# Patient Record
Sex: MALE | Race: WHITE | URBAN - METROPOLITAN AREA
[De-identification: names, ages, dates, MRNs, and addresses within clinical notes are randomized per-mention and may not be internally consistent; named-entity substitution may affect disease eponyms.]

---

## 2024-02-22 ENCOUNTER — OFFICE VISIT (OUTPATIENT)
Age: 39
End: 2024-02-22

## 2024-02-22 VITALS
TEMPERATURE: 98.4 F | HEART RATE: 64 BPM | WEIGHT: 197.8 LBS | SYSTOLIC BLOOD PRESSURE: 136 MMHG | BODY MASS INDEX: 29.98 KG/M2 | OXYGEN SATURATION: 98 % | HEIGHT: 68 IN | DIASTOLIC BLOOD PRESSURE: 85 MMHG

## 2024-02-22 DIAGNOSIS — R68.89 FLU-LIKE SYMPTOMS: Primary | ICD-10-CM

## 2024-02-22 PROBLEM — J34.89 SINUS PRESSURE: Status: ACTIVE | Noted: 2024-02-22

## 2024-02-22 LAB
INFLUENZA A ANTIGEN, POC: NEGATIVE
INFLUENZA B ANTIGEN, POC: NEGATIVE

## 2024-02-22 ASSESSMENT — ENCOUNTER SYMPTOMS
COUGH: 1
SWOLLEN GLANDS: 0
NAUSEA: 0
SINUS PAIN: 1
DIARRHEA: 0
VOMITING: 0
SORE THROAT: 1

## 2024-02-22 NOTE — PROGRESS NOTES
Left Ear: Tympanic membrane and ear canal normal.      Nose: Congestion present.      Mouth/Throat:      Pharynx: Posterior oropharyngeal erythema present. No oropharyngeal exudate.   Eyes:      Conjunctiva/sclera: Conjunctivae normal.   Cardiovascular:      Rate and Rhythm: Normal rate and regular rhythm.      Heart sounds: Normal heart sounds.   Pulmonary:      Effort: Pulmonary effort is normal. No respiratory distress.      Breath sounds: No wheezing, rhonchi or rales.   Musculoskeletal:      Cervical back: Neck supple. No tenderness.   Lymphadenopathy:      Cervical: No cervical adenopathy.   Skin:     General: Skin is warm.   Neurological:      Mental Status: He is alert and oriented to person, place, and time.   Psychiatric:         Behavior: Behavior normal.              An electronic signature was used to authenticate this note.    Daniele Claudio, APRN - CNP

## 2024-02-22 NOTE — PATIENT INSTRUCTIONS
Rapid influenza was NEG.  Home COVID neg.  No sign of bacterial infection and clear lungs.  Still suspect flu due to prevalence right now.  Symptoms > 48 hours so antiviral not indicated.  Continue conservative care at home including rest, hydration and Tylenol/Ibuprofen for fever and body aches.  Review printed materials.  Contagious until no fever for 24 hours.

## 2025-07-17 ENCOUNTER — OFFICE VISIT (OUTPATIENT)
Age: 40
End: 2025-07-17

## 2025-07-17 VITALS
HEIGHT: 68 IN | OXYGEN SATURATION: 96 % | BODY MASS INDEX: 29.21 KG/M2 | DIASTOLIC BLOOD PRESSURE: 77 MMHG | SYSTOLIC BLOOD PRESSURE: 116 MMHG | HEART RATE: 72 BPM | TEMPERATURE: 100.2 F | WEIGHT: 192.7 LBS

## 2025-07-17 DIAGNOSIS — H60.11 CELLULITIS OF ANTIHELIX OF RIGHT EAR: Primary | ICD-10-CM

## 2025-07-17 NOTE — PROGRESS NOTES
Rj Bryan (: 1985) is a 40 y.o. male, Established patient, here for evaluation of the following chief complaint(s):  Ear Pain (C/o right ear pain, chills, and  swelling x 2 days )      ASSESSMENT/PLAN:  No diagnosis found.      Discussed PCP follow up for persisting or worsening symptoms, or to return to the clinic if unable to obtain PCP follow up for worsening symptoms.    Reviewed AVS with treatment instructions and answered questions - patient acknowledges understanding and agreement with the discussed treatment plan and AVS instructions.          SUBJECTIVE/OBJECTIVE:  Patient is here with right ear pain which is also swollen and warm to the touch patient started with these symptoms yesterday.  Patient has not been swimming. Patient denies any cold symptoms.  Patient took ibuprofen last night and this AM at 0500. Patient takes 2 OTC.      Ear Pain   Associated symptoms include headaches.     VITAL SIGNS  Vitals:    25 1335   BP: 116/77   BP Site: Left Upper Arm   Patient Position: Sitting   BP Cuff Size: Large Adult   Pulse: 72   Temp: 100.2 °F (37.9 °C)   TempSrc: Oral   SpO2: 96%   Weight: 87.4 kg (192 lb 11.2 oz)   Height: 1.727 m (5' 8\")       Review of Systems   Constitutional:  Positive for chills, diaphoresis, fatigue and fever.   HENT:  Positive for ear pain.    Musculoskeletal:  Positive for arthralgias and myalgias.   Neurological:  Positive for headaches.   Hematological:  Positive for adenopathy.     Pertinent positives and negatives as above, or may be included within the HPI.    Physical Exam  Constitutional:       General: He is not in acute distress.     Appearance: Normal appearance. He is not ill-appearing or toxic-appearing.   HENT:      Head: Normocephalic and atraumatic.      Right Ear: There is impacted cerumen.      Left Ear: Tympanic membrane, ear canal and external ear normal. There is no impacted cerumen.      Ears:      Comments: Unable to see right TM R/T